# Patient Record
Sex: MALE | Race: WHITE | NOT HISPANIC OR LATINO | Employment: FULL TIME | URBAN - METROPOLITAN AREA
[De-identification: names, ages, dates, MRNs, and addresses within clinical notes are randomized per-mention and may not be internally consistent; named-entity substitution may affect disease eponyms.]

---

## 2019-09-18 PROBLEM — I10 HYPERTENSION: Status: ACTIVE | Noted: 2019-09-18

## 2019-09-18 PROBLEM — I73.9 CLAUDICATION: Status: ACTIVE | Noted: 2018-09-20

## 2019-09-18 PROBLEM — E03.9 HYPOTHYROIDISM: Status: ACTIVE | Noted: 2019-09-18

## 2019-09-18 PROBLEM — I25.10 CORONARY ARTERY DISEASE: Status: ACTIVE | Noted: 2019-09-18

## 2019-09-18 PROBLEM — E78.5 HYPERLIPIDEMIA: Status: ACTIVE | Noted: 2019-09-18

## 2023-04-13 ENCOUNTER — HOSPITAL ENCOUNTER (OUTPATIENT)
Facility: HOSPITAL | Age: 58
Discharge: HOME | End: 2023-04-14
Attending: INTERNAL MEDICINE | Admitting: INTERNAL MEDICINE
Payer: COMMERCIAL

## 2023-04-13 DIAGNOSIS — I25.119 CHEST PAIN DUE TO CORONARY ARTERY DISEASE: Primary | ICD-10-CM

## 2023-04-13 LAB
ALBUMIN SERPL-MCNC: 3.9 G/DL (ref 3.5–5.2)
ALBUMIN/GLOB SERPL: 1.6 G/DL
ALP SERPL-CCNC: 68 U/L (ref 39–117)
ALT SERPL W P-5'-P-CCNC: 19 U/L (ref 1–41)
ANION GAP SERPL CALCULATED.3IONS-SCNC: 9 MMOL/L (ref 5–15)
ANION GAP SERPL CALCULATED.3IONS-SCNC: 9 MMOL/L (ref 5–15)
APTT PPP: 31.5 SECONDS (ref 61–76.5)
AST SERPL-CCNC: 39 U/L (ref 1–40)
BASOPHILS # BLD AUTO: 0.1 10*3/MM3 (ref 0–0.2)
BASOPHILS NFR BLD AUTO: 1 % (ref 0–1.5)
BILIRUB SERPL-MCNC: 0.3 MG/DL (ref 0–1.2)
BUN SERPL-MCNC: 24 MG/DL (ref 6–20)
BUN SERPL-MCNC: 24 MG/DL (ref 6–20)
BUN/CREAT SERPL: 17.3 (ref 7–25)
BUN/CREAT SERPL: 17.4 (ref 7–25)
CALCIUM SPEC-SCNC: 8.8 MG/DL (ref 8.6–10.5)
CALCIUM SPEC-SCNC: 8.9 MG/DL (ref 8.6–10.5)
CHLORIDE SERPL-SCNC: 102 MMOL/L (ref 98–107)
CHLORIDE SERPL-SCNC: 103 MMOL/L (ref 98–107)
CHOLEST SERPL-MCNC: 142 MG/DL (ref 0–200)
CO2 SERPL-SCNC: 27 MMOL/L (ref 22–29)
CO2 SERPL-SCNC: 28 MMOL/L (ref 22–29)
CREAT SERPL-MCNC: 1.38 MG/DL (ref 0.76–1.27)
CREAT SERPL-MCNC: 1.39 MG/DL (ref 0.76–1.27)
DEPRECATED RDW RBC AUTO: 47.7 FL (ref 37–54)
DEPRECATED RDW RBC AUTO: 47.7 FL (ref 37–54)
EGFRCR SERPLBLD CKD-EPI 2021: 59.1 ML/MIN/1.73
EGFRCR SERPLBLD CKD-EPI 2021: 59.6 ML/MIN/1.73
EOSINOPHIL # BLD AUTO: 0.2 10*3/MM3 (ref 0–0.4)
EOSINOPHIL NFR BLD AUTO: 2.6 % (ref 0.3–6.2)
ERYTHROCYTE [DISTWIDTH] IN BLOOD BY AUTOMATED COUNT: 13.1 % (ref 12.3–15.4)
ERYTHROCYTE [DISTWIDTH] IN BLOOD BY AUTOMATED COUNT: 13.4 % (ref 12.3–15.4)
GEN 5 2HR TROPONIN T REFLEX: 337 NG/L
GLOBULIN UR ELPH-MCNC: 2.5 GM/DL
GLUCOSE BLDC GLUCOMTR-MCNC: 81 MG/DL (ref 70–105)
GLUCOSE SERPL-MCNC: 133 MG/DL (ref 65–99)
GLUCOSE SERPL-MCNC: 136 MG/DL (ref 65–99)
HBA1C MFR BLD: 5.5 % (ref 4.8–5.6)
HCT VFR BLD AUTO: 43.6 % (ref 37.5–51)
HCT VFR BLD AUTO: 43.9 % (ref 37.5–51)
HDLC SERPL-MCNC: 47 MG/DL (ref 40–60)
HGB BLD-MCNC: 14.6 G/DL (ref 13–17.7)
HGB BLD-MCNC: 15 G/DL (ref 13–17.7)
INR PPP: 1.01 (ref 0.93–1.1)
LDLC SERPL CALC-MCNC: 65 MG/DL (ref 0–100)
LDLC/HDLC SERPL: 1.27 {RATIO}
LYMPHOCYTES # BLD AUTO: 2 10*3/MM3 (ref 0.7–3.1)
LYMPHOCYTES NFR BLD AUTO: 20.5 % (ref 19.6–45.3)
MCH RBC QN AUTO: 34.1 PG (ref 26.6–33)
MCH RBC QN AUTO: 34.6 PG (ref 26.6–33)
MCHC RBC AUTO-ENTMCNC: 33.3 G/DL (ref 31.5–35.7)
MCHC RBC AUTO-ENTMCNC: 34.3 G/DL (ref 31.5–35.7)
MCV RBC AUTO: 100.6 FL (ref 79–97)
MCV RBC AUTO: 102.2 FL (ref 79–97)
MONOCYTES # BLD AUTO: 0.6 10*3/MM3 (ref 0.1–0.9)
MONOCYTES NFR BLD AUTO: 6.6 % (ref 5–12)
NEUTROPHILS NFR BLD AUTO: 6.8 10*3/MM3 (ref 1.7–7)
NEUTROPHILS NFR BLD AUTO: 69.3 % (ref 42.7–76)
NRBC BLD AUTO-RTO: 0 /100 WBC (ref 0–0.2)
PLATELET # BLD AUTO: 321 10*3/MM3 (ref 140–450)
PLATELET # BLD AUTO: 325 10*3/MM3 (ref 140–450)
PMV BLD AUTO: 6.5 FL (ref 6–12)
PMV BLD AUTO: 7 FL (ref 6–12)
POTASSIUM SERPL-SCNC: 4 MMOL/L (ref 3.5–5.2)
POTASSIUM SERPL-SCNC: 4.2 MMOL/L (ref 3.5–5.2)
PROT SERPL-MCNC: 6.4 G/DL (ref 6–8.5)
PROTHROMBIN TIME: 10.4 SECONDS (ref 9.6–11.7)
RBC # BLD AUTO: 4.29 10*6/MM3 (ref 4.14–5.8)
RBC # BLD AUTO: 4.33 10*6/MM3 (ref 4.14–5.8)
SODIUM SERPL-SCNC: 139 MMOL/L (ref 136–145)
SODIUM SERPL-SCNC: 139 MMOL/L (ref 136–145)
T4 FREE SERPL-MCNC: 0.62 NG/DL (ref 0.93–1.7)
TRIGL SERPL-MCNC: 177 MG/DL (ref 0–150)
TROPONIN T DELTA: 56 NG/L
TROPONIN T SERPL HS-MCNC: 281 NG/L
TSH SERPL DL<=0.05 MIU/L-ACNC: 45.22 UIU/ML (ref 0.27–4.2)
VLDLC SERPL-MCNC: 30 MG/DL (ref 5–40)
WBC NRBC COR # BLD: 9.7 10*3/MM3 (ref 3.4–10.8)
WBC NRBC COR # BLD: 9.8 10*3/MM3 (ref 3.4–10.8)

## 2023-04-13 PROCEDURE — 84481 FREE ASSAY (FT-3): CPT | Performed by: STUDENT IN AN ORGANIZED HEALTH CARE EDUCATION/TRAINING PROGRAM

## 2023-04-13 PROCEDURE — 85027 COMPLETE CBC AUTOMATED: CPT | Performed by: STUDENT IN AN ORGANIZED HEALTH CARE EDUCATION/TRAINING PROGRAM

## 2023-04-13 PROCEDURE — 80053 COMPREHEN METABOLIC PANEL: CPT | Performed by: STUDENT IN AN ORGANIZED HEALTH CARE EDUCATION/TRAINING PROGRAM

## 2023-04-13 PROCEDURE — 85730 THROMBOPLASTIN TIME PARTIAL: CPT | Performed by: STUDENT IN AN ORGANIZED HEALTH CARE EDUCATION/TRAINING PROGRAM

## 2023-04-13 PROCEDURE — 84439 ASSAY OF FREE THYROXINE: CPT | Performed by: STUDENT IN AN ORGANIZED HEALTH CARE EDUCATION/TRAINING PROGRAM

## 2023-04-13 PROCEDURE — 83036 HEMOGLOBIN GLYCOSYLATED A1C: CPT | Performed by: STUDENT IN AN ORGANIZED HEALTH CARE EDUCATION/TRAINING PROGRAM

## 2023-04-13 PROCEDURE — 85025 COMPLETE CBC W/AUTO DIFF WBC: CPT | Performed by: STUDENT IN AN ORGANIZED HEALTH CARE EDUCATION/TRAINING PROGRAM

## 2023-04-13 PROCEDURE — G0378 HOSPITAL OBSERVATION PER HR: HCPCS

## 2023-04-13 PROCEDURE — 82962 GLUCOSE BLOOD TEST: CPT

## 2023-04-13 PROCEDURE — 93005 ELECTROCARDIOGRAM TRACING: CPT | Performed by: STUDENT IN AN ORGANIZED HEALTH CARE EDUCATION/TRAINING PROGRAM

## 2023-04-13 PROCEDURE — 84443 ASSAY THYROID STIM HORMONE: CPT | Performed by: STUDENT IN AN ORGANIZED HEALTH CARE EDUCATION/TRAINING PROGRAM

## 2023-04-13 PROCEDURE — 96365 THER/PROPH/DIAG IV INF INIT: CPT

## 2023-04-13 PROCEDURE — 25010000002 HEPARIN (PORCINE) 25000-0.45 UT/250ML-% SOLUTION: Performed by: STUDENT IN AN ORGANIZED HEALTH CARE EDUCATION/TRAINING PROGRAM

## 2023-04-13 PROCEDURE — 96366 THER/PROPH/DIAG IV INF ADDON: CPT

## 2023-04-13 PROCEDURE — 85610 PROTHROMBIN TIME: CPT | Performed by: STUDENT IN AN ORGANIZED HEALTH CARE EDUCATION/TRAINING PROGRAM

## 2023-04-13 PROCEDURE — 80061 LIPID PANEL: CPT | Performed by: STUDENT IN AN ORGANIZED HEALTH CARE EDUCATION/TRAINING PROGRAM

## 2023-04-13 PROCEDURE — 84484 ASSAY OF TROPONIN QUANT: CPT | Performed by: STUDENT IN AN ORGANIZED HEALTH CARE EDUCATION/TRAINING PROGRAM

## 2023-04-13 RX ORDER — ONDANSETRON 2 MG/ML
4 INJECTION INTRAMUSCULAR; INTRAVENOUS EVERY 6 HOURS PRN
Status: DISCONTINUED | OUTPATIENT
Start: 2023-04-13 | End: 2023-04-15 | Stop reason: HOSPADM

## 2023-04-13 RX ORDER — ONDANSETRON 4 MG/1
4 TABLET, FILM COATED ORAL EVERY 6 HOURS PRN
Status: DISCONTINUED | OUTPATIENT
Start: 2023-04-13 | End: 2023-04-15 | Stop reason: HOSPADM

## 2023-04-13 RX ORDER — LEVOTHYROXINE SODIUM 0.2 MG/1
200 TABLET ORAL
Status: DISCONTINUED | OUTPATIENT
Start: 2023-04-14 | End: 2023-04-14

## 2023-04-13 RX ORDER — SODIUM CHLORIDE, SODIUM LACTATE, POTASSIUM CHLORIDE, CALCIUM CHLORIDE 600; 310; 30; 20 MG/100ML; MG/100ML; MG/100ML; MG/100ML
50 INJECTION, SOLUTION INTRAVENOUS CONTINUOUS
Status: DISCONTINUED | OUTPATIENT
Start: 2023-04-14 | End: 2023-04-13

## 2023-04-13 RX ORDER — SODIUM CHLORIDE 0.9 % (FLUSH) 0.9 %
10 SYRINGE (ML) INJECTION EVERY 12 HOURS SCHEDULED
Status: DISCONTINUED | OUTPATIENT
Start: 2023-04-13 | End: 2023-04-15 | Stop reason: HOSPADM

## 2023-04-13 RX ORDER — HEPARIN SODIUM 5000 [USP'U]/ML
5000 INJECTION, SOLUTION INTRAVENOUS; SUBCUTANEOUS EVERY 8 HOURS SCHEDULED
Status: DISCONTINUED | OUTPATIENT
Start: 2023-04-13 | End: 2023-04-13

## 2023-04-13 RX ORDER — SODIUM CHLORIDE 9 MG/ML
40 INJECTION, SOLUTION INTRAVENOUS AS NEEDED
Status: DISCONTINUED | OUTPATIENT
Start: 2023-04-13 | End: 2023-04-15 | Stop reason: HOSPADM

## 2023-04-13 RX ORDER — ATORVASTATIN CALCIUM 40 MG/1
80 TABLET, FILM COATED ORAL DAILY
Status: DISCONTINUED | OUTPATIENT
Start: 2023-04-14 | End: 2023-04-15 | Stop reason: HOSPADM

## 2023-04-13 RX ORDER — SODIUM CHLORIDE 0.9 % (FLUSH) 0.9 %
10 SYRINGE (ML) INJECTION AS NEEDED
Status: DISCONTINUED | OUTPATIENT
Start: 2023-04-13 | End: 2023-04-15 | Stop reason: HOSPADM

## 2023-04-13 RX ORDER — NITROGLYCERIN 0.4 MG/1
0.4 TABLET SUBLINGUAL
Status: DISCONTINUED | OUTPATIENT
Start: 2023-04-13 | End: 2023-04-15 | Stop reason: HOSPADM

## 2023-04-13 RX ORDER — HEPARIN SODIUM 10000 [USP'U]/100ML
10.5 INJECTION, SOLUTION INTRAVENOUS
Status: DISCONTINUED | OUTPATIENT
Start: 2023-04-13 | End: 2023-04-15 | Stop reason: HOSPADM

## 2023-04-13 RX ORDER — METOPROLOL SUCCINATE 50 MG/1
50 TABLET, EXTENDED RELEASE ORAL
Status: DISCONTINUED | OUTPATIENT
Start: 2023-04-14 | End: 2023-04-15 | Stop reason: HOSPADM

## 2023-04-13 RX ORDER — ASPIRIN 81 MG/1
81 TABLET ORAL DAILY
Status: DISCONTINUED | OUTPATIENT
Start: 2023-04-14 | End: 2023-04-15 | Stop reason: HOSPADM

## 2023-04-13 RX ADMIN — Medication 10 ML: at 22:25

## 2023-04-13 RX ADMIN — HEPARIN SODIUM 10.5 UNITS/KG/HR: 10000 INJECTION, SOLUTION INTRAVENOUS at 22:21

## 2023-04-13 NOTE — Clinical Note
Angio-Seal was used in achieving hemostasis. Closure device deployed in the vessel. Hemostasis achieved successfully.

## 2023-04-13 NOTE — Clinical Note
The left DP pulse is +2. The right DP pulse is detected w/ doppler. The PT pulses are +2 bilaterally.

## 2023-04-14 VITALS
OXYGEN SATURATION: 94 % | BODY MASS INDEX: 27.85 KG/M2 | HEIGHT: 73 IN | SYSTOLIC BLOOD PRESSURE: 149 MMHG | DIASTOLIC BLOOD PRESSURE: 81 MMHG | WEIGHT: 210.1 LBS | TEMPERATURE: 97.6 F | RESPIRATION RATE: 12 BRPM | HEART RATE: 61 BPM

## 2023-04-14 LAB
APTT PPP: 44.7 SECONDS (ref 61–76.5)
APTT PPP: 83 SECONDS (ref 61–76.5)
T3FREE SERPL-MCNC: 1.62 PG/ML (ref 2–4.4)

## 2023-04-14 PROCEDURE — 93454 CORONARY ARTERY ANGIO S&I: CPT | Performed by: INTERNAL MEDICINE

## 2023-04-14 PROCEDURE — C1894 INTRO/SHEATH, NON-LASER: HCPCS | Performed by: INTERNAL MEDICINE

## 2023-04-14 PROCEDURE — G0378 HOSPITAL OBSERVATION PER HR: HCPCS

## 2023-04-14 PROCEDURE — 85730 THROMBOPLASTIN TIME PARTIAL: CPT | Performed by: INTERNAL MEDICINE

## 2023-04-14 PROCEDURE — 25510000001 IOPAMIDOL PER 1 ML: Performed by: INTERNAL MEDICINE

## 2023-04-14 PROCEDURE — C1760 CLOSURE DEV, VASC: HCPCS | Performed by: INTERNAL MEDICINE

## 2023-04-14 PROCEDURE — 25010000002 FENTANYL CITRATE (PF) 100 MCG/2ML SOLUTION: Performed by: INTERNAL MEDICINE

## 2023-04-14 PROCEDURE — 85347 COAGULATION TIME ACTIVATED: CPT

## 2023-04-14 PROCEDURE — C1769 GUIDE WIRE: HCPCS | Performed by: INTERNAL MEDICINE

## 2023-04-14 PROCEDURE — 96366 THER/PROPH/DIAG IV INF ADDON: CPT

## 2023-04-14 PROCEDURE — 25010000002 MIDAZOLAM PER 1 MG: Performed by: INTERNAL MEDICINE

## 2023-04-14 PROCEDURE — 85730 THROMBOPLASTIN TIME PARTIAL: CPT | Performed by: STUDENT IN AN ORGANIZED HEALTH CARE EDUCATION/TRAINING PROGRAM

## 2023-04-14 PROCEDURE — 25010000002 HEPARIN (PORCINE) 25000-0.45 UT/250ML-% SOLUTION: Performed by: STUDENT IN AN ORGANIZED HEALTH CARE EDUCATION/TRAINING PROGRAM

## 2023-04-14 PROCEDURE — 99152 MOD SED SAME PHYS/QHP 5/>YRS: CPT | Performed by: INTERNAL MEDICINE

## 2023-04-14 RX ORDER — METOPROLOL SUCCINATE 50 MG/1
50 TABLET, EXTENDED RELEASE ORAL
Qty: 30 TABLET | Refills: 0 | Status: SHIPPED | OUTPATIENT
Start: 2023-04-15

## 2023-04-14 RX ORDER — MIDAZOLAM HYDROCHLORIDE 1 MG/ML
INJECTION INTRAMUSCULAR; INTRAVENOUS
Status: DISCONTINUED | OUTPATIENT
Start: 2023-04-14 | End: 2023-04-14 | Stop reason: HOSPADM

## 2023-04-14 RX ORDER — CLOPIDOGREL BISULFATE 75 MG/1
75 TABLET ORAL DAILY
Status: DISCONTINUED | OUTPATIENT
Start: 2023-04-14 | End: 2023-04-15 | Stop reason: HOSPADM

## 2023-04-14 RX ORDER — SODIUM CHLORIDE 9 MG/ML
125 INJECTION, SOLUTION INTRAVENOUS CONTINUOUS
Status: DISCONTINUED | OUTPATIENT
Start: 2023-04-14 | End: 2023-04-15 | Stop reason: HOSPADM

## 2023-04-14 RX ORDER — CLOPIDOGREL BISULFATE 75 MG/1
75 TABLET ORAL DAILY
COMMUNITY

## 2023-04-14 RX ORDER — NITROGLYCERIN 0.4 MG/1
0.4 TABLET SUBLINGUAL
COMMUNITY

## 2023-04-14 RX ORDER — SODIUM CHLORIDE 9 MG/ML
75 INJECTION, SOLUTION INTRAVENOUS CONTINUOUS
Status: CANCELLED | OUTPATIENT
Start: 2023-04-14

## 2023-04-14 RX ORDER — LIDOCAINE HYDROCHLORIDE 20 MG/ML
INJECTION, SOLUTION INFILTRATION; PERINEURAL
Status: DISCONTINUED | OUTPATIENT
Start: 2023-04-14 | End: 2023-04-14 | Stop reason: HOSPADM

## 2023-04-14 RX ORDER — LEVOTHYROXINE SODIUM 175 UG/1
175 TABLET ORAL 3 TIMES WEEKLY
COMMUNITY

## 2023-04-14 RX ORDER — FENTANYL CITRATE 50 UG/ML
25 INJECTION, SOLUTION INTRAMUSCULAR; INTRAVENOUS ONCE
Status: CANCELLED | OUTPATIENT
Start: 2023-04-14 | End: 2023-04-14

## 2023-04-14 RX ORDER — FENTANYL CITRATE 50 UG/ML
INJECTION, SOLUTION INTRAMUSCULAR; INTRAVENOUS
Status: DISCONTINUED | OUTPATIENT
Start: 2023-04-14 | End: 2023-04-14 | Stop reason: HOSPADM

## 2023-04-14 RX ORDER — EZETIMIBE 10 MG/1
10 TABLET ORAL DAILY
COMMUNITY

## 2023-04-14 RX ORDER — SODIUM CHLORIDE 9 MG/ML
INJECTION, SOLUTION INTRAVENOUS
Status: COMPLETED | OUTPATIENT
Start: 2023-04-14 | End: 2023-04-14

## 2023-04-14 RX ORDER — ACETAMINOPHEN 325 MG/1
650 TABLET ORAL EVERY 4 HOURS PRN
Status: CANCELLED | OUTPATIENT
Start: 2023-04-14

## 2023-04-14 RX ORDER — SODIUM CHLORIDE 9 MG/ML
250 INJECTION, SOLUTION INTRAVENOUS ONCE AS NEEDED
Status: CANCELLED | OUTPATIENT
Start: 2023-04-14

## 2023-04-14 RX ORDER — MIDAZOLAM HYDROCHLORIDE 1 MG/ML
1 INJECTION INTRAMUSCULAR; INTRAVENOUS ONCE
Status: CANCELLED | OUTPATIENT
Start: 2023-04-14 | End: 2023-04-14

## 2023-04-14 RX ORDER — LEVOTHYROXINE SODIUM 175 UG/1
175 TABLET ORAL
Status: DISCONTINUED | OUTPATIENT
Start: 2023-04-14 | End: 2023-04-15 | Stop reason: HOSPADM

## 2023-04-14 RX ADMIN — ASPIRIN 81 MG: 81 TABLET, COATED ORAL at 08:34

## 2023-04-14 RX ADMIN — CLOPIDOGREL BISULFATE 75 MG: 75 TABLET ORAL at 11:14

## 2023-04-14 RX ADMIN — ATORVASTATIN CALCIUM 80 MG: 40 TABLET, FILM COATED ORAL at 08:34

## 2023-04-14 RX ADMIN — SODIUM CHLORIDE 125 ML/HR: 9 INJECTION, SOLUTION INTRAVENOUS at 12:53

## 2023-04-14 RX ADMIN — Medication 10 ML: at 08:34

## 2023-04-14 RX ADMIN — HEPARIN SODIUM 11.5 UNITS/KG/HR: 10000 INJECTION, SOLUTION INTRAVENOUS at 06:19

## 2023-04-14 NOTE — DISCHARGE SUMMARY
[facility] Medicine Services   DISCHARGE SUMMARY    Patient Name: Ayan Blair  : 1965  MRN: [id number]    Date of Admission: 2023  Date of Discharge:  23    Primary Care Physician: Silvino Peoples MD      Presenting Problem:   Chest pain due to coronary artery disease [I25.119]    Active and Resolved Hospital Problems:  Active Hospital Problems    Diagnosis POA   • **Chest pain due to coronary artery disease [I25.119] Yes      Resolved Hospital Problems   No resolved problems to display.         Hospital Course     Hospital Course:  Ayan Blair is a 57 y.o. male with past medical history of CAD, hypertension, hyperlipidemia, and hypothyroidism who presented to [facility] on 2023 complaining of chest pain.  States he had intermittent episodes of 6/10 non-radiating chest pain lasting over 3 hours in the morning complicated by diaphoresis.  He went to hospitals ER for evaluate, he was to be transferred to [facility] but left hospitals AMA.  Patient then called his cardiologist and was directly admitted to [facility] for cardiac evaluation.  Cardiology consulted, started on heparin drip for elevated troponin/NSTEMI.  Cardiology planning cardiac cath on .  Cath negative, okay to discharge per cardiology with outpatient follow-up.  Beta-blocker added back.  Patient is stable for discharge home with follow-up with PCP and cardiology as an outpatient.        DISCHARGE Follow Up Recommendations for labs and diagnostics:   Follow-up with PCP and cardiology    Reasons For Change In Medications and Indications for New Medications:  Metoprolol added back.    Day of Discharge     Vital Signs:  Temp:  [97.9 °F (36.6 °C)-98.4 °F (36.9 °C)] 97.9 °F (36.6 °C)  Heart Rate:  [58-72] 62  Resp:  [10-16] 16  BP: (131-167)/() 162/100  Flow (L/min):  [2] 2    Physical Exam:  General: Awake, alert, NAD  Eyes: PERRL, EOMI, conjunctivae are clear  Cardiovascular: Regular rate and rhythm, no  murmurs  Respiratory: Clear to auscultation bilaterally, no wheezing or rales, unlabored breathing  Abdomen: Soft, nontender, positive bowel sounds, no guarding  Neurologic: A&O, CN grossly intact, moves all extremities spontaneously  Musculoskeletal: Normal range of motion, no deformities  Skin: Warm, dry, intact        Pertinent  and/or Most Recent Results     LAB RESULTS:      Lab 04/14/23  1249 04/14/23  0457 04/13/23 2255 04/13/23  2101   WBC  --   --  9.70 9.80   HEMOGLOBIN  --   --  14.6 15.0   HEMATOCRIT  --   --  43.9 43.6   PLATELETS  --   --  325 321   NEUTROS ABS  --   --   --  6.80   LYMPHS ABS  --   --   --  2.00   MONOS ABS  --   --   --  0.60   EOS ABS  --   --   --  0.20   MCV  --   --  102.2* 100.6*   PROTIME  --   --  10.4  --    APTT 83.0* 44.7* 31.5*  --          Lab 04/13/23 2255 04/13/23  2101   SODIUM 139 139   POTASSIUM 4.2 4.0   CHLORIDE 102 103   CO2 28.0 27.0   ANION GAP 9.0 9.0   BUN 24* 24*   CREATININE 1.39* 1.38*   EGFR 59.1* 59.6*   GLUCOSE 133* 136*   CALCIUM 8.8 8.9   HEMOGLOBIN A1C  --  5.50   TSH  --  45.220*         Lab 04/13/23  2101   TOTAL PROTEIN 6.4   ALBUMIN 3.9   GLOBULIN 2.5   ALT (SGPT) 19   AST (SGOT) 39   BILIRUBIN 0.3   ALK PHOS 68         Lab 04/13/23 2255 04/13/23 2101   HSTROP T 337* 281*   PROTIME 10.4  --    INR 1.01  --          Lab 04/13/23  2101   CHOLESTEROL 142   LDL CHOL 65   HDL CHOL 47   TRIGLYCERIDES 177*             Brief Urine Lab Results     None        Microbiology Results (last 10 days)     ** No results found for the last 240 hours. **                           Labs Pending at Discharge:      Procedures Performed  Procedure(s):  Left Heart Cath and coronary angiogram         Consults:   Consults     Date and Time Order Name Status Description    4/13/2023  9:53 PM Inpatient Hospitalist Consult              Discharge Details        Discharge Medications      Changes to Medications      Instructions Start Date   metoprolol succinate XL 50 MG 24  hr tablet  Commonly known as: Toprol XL  What changed: See the new instructions.   50 mg, Oral, Every 24 Hours Scheduled   Start Date: April 15, 2023        Continue These Medications      Instructions Start Date   aspirin 81 MG EC tablet   Every 24 Hours      atorvastatin 40 MG tablet  Commonly known as: LIPITOR   Every 24 Hours      clopidogrel 75 MG tablet  Commonly known as: PLAVIX   75 mg, Oral, Daily      ezetimibe 10 MG tablet  Commonly known as: ZETIA   10 mg, Oral, Daily      levothyroxine 175 MCG tablet  Commonly known as: SYNTHROID, LEVOTHROID   175 mcg, Oral, 3 Times Weekly, Monday, Wednesday, Friday      nitroglycerin 0.4 MG SL tablet  Commonly known as: NITROSTAT   0.4 mg, Sublingual, Every 5 Minutes PRN, Take no more than 3 doses in 15 minutes.             Allergies   Allergen Reactions   • Penicillins Unknown - Low Severity     Was told he is allergic by parents. Unknown reaction.         Discharge Disposition:  Home or Self Care    Diet:  Hospital:  Diet Order   Procedures   • NPO Diet NPO Type: Sips with Meds         Discharge Activity:         CODE STATUS:  Code Status and Medical Interventions:   Ordered at: 04/14/23 1020     Code Status (Patient has no pulse and is not breathing):    CPR (Attempt to Resuscitate)     Medical Interventions (Patient has pulse or is breathing):    Full Support     Release to patient:    Routine Release         Future Appointments   Date Time Provider Department Center   4/19/2023 12:40 PM Russ Richard MD [facility]           Time spent on Discharge including face to face service: 35 minutes    Signature:    Electronically signed by Perico Grigsby DO, 04/14/23, 4:04 PM EDT.      Part of this note may be an electronic transcription/translation of spoken language to printed text using the Dragon Dictation System.

## 2023-04-14 NOTE — PLAN OF CARE
Goal Outcome Evaluation:                 Problem: Adult Inpatient Plan of Care  Goal: Plan of Care Review  Outcome: Ongoing, Progressing  Goal: Patient-Specific Goal (Individualized)  Outcome: Ongoing, Progressing  Goal: Absence of Hospital-Acquired Illness or Injury  Outcome: Ongoing, Progressing  Intervention: Identify and Manage Fall Risk  Recent Flowsheet Documentation  Taken 4/14/2023 1400 by Binta Whalen RN  Safety Promotion/Fall Prevention:   assistive device/personal items within reach   clutter free environment maintained   safety round/check completed   room organization consistent   nonskid shoes/slippers when out of bed   lighting adjusted   fall prevention program maintained  Taken 4/14/2023 1200 by Binta Whalen RN  Safety Promotion/Fall Prevention:   assistive device/personal items within reach   clutter free environment maintained   safety round/check completed   room organization consistent   nonskid shoes/slippers when out of bed   lighting adjusted  Intervention: Prevent Skin Injury  Recent Flowsheet Documentation  Taken 4/14/2023 1141 by Binta Whalen RN  Skin Protection: adhesive use limited  Intervention: Prevent Infection  Recent Flowsheet Documentation  Taken 4/14/2023 1400 by Binta Whalen RN  Infection Prevention:   single patient room provided   rest/sleep promoted   personal protective equipment utilized   hand hygiene promoted   equipment surfaces disinfected  Taken 4/14/2023 1200 by Binta Whalen RN  Infection Prevention:   single patient room provided   rest/sleep promoted   personal protective equipment utilized   hand hygiene promoted   equipment surfaces disinfected  Goal: Optimal Comfort and Wellbeing  Outcome: Ongoing, Progressing  Intervention: Provide Person-Centered Care  Recent Flowsheet Documentation  Taken 4/14/2023 1141 by Binta Whalen RN  Trust Relationship/Rapport:   care explained   choices provided  Goal: Readiness for Transition of  Care  Outcome: Ongoing, Progressing     Problem: Hypertension Comorbidity  Goal: Blood Pressure in Desired Range  Outcome: Ongoing, Progressing  Intervention: Maintain Blood Pressure Management  Recent Flowsheet Documentation  Taken 4/14/2023 1400 by Binta Whalen, RN  Medication Review/Management: medications reviewed  Taken 4/14/2023 1200 by Binta Whalen, RN  Medication Review/Management: medications reviewed

## 2023-04-14 NOTE — PLAN OF CARE
Goal Outcome Evaluation:         Pt aaox4, vss, direct admit from [place], pt had chest pain and dizziness yesterday morning, admitted for heart cath today, will be NPO after breakfast, elevated troponins on heparin gtt at 11.5, pt up ad ailyn, no complaints of pain or discomfort

## 2023-04-14 NOTE — H&P
[facility] Medicine Services      Patient Name: Ayan Blair  : 1965  MRN: [id number]  Primary Care Physician:  Silvino Peoples MD  Date of admission: 2023      Subjective      Chief Complaint: Chest pain    History of Present Illness: Ayan Blair is a 57 y.o. male who presented to [facility] on 2023 complaining of chest pain.  States he had intermittent episodes of 6/10 non-radiating chest pain lasting over 3 hours this morning complicated by diaphoresis.  He went to hospitals ER for evaluate, he was to be transferred to [facility] but left hospitals AMA.  Patient then called his cardiologist and was directly admitted to [facility] for cardiac evaluation.      ROS   12 point ROS reviewed and negative except as mentioned above      Personal History     No past medical history on file.    No past surgical history on file.    Family History: family history is not on file. Otherwise pertinent FHx was reviewed and not pertinent to current issue.    Social History:      Home Medications:  Prior to Admission Medications     Prescriptions Last Dose Informant Patient Reported? Taking?    anastrozole (ARIMIDEX) 1 MG tablet   Yes No    Daily.    aspirin (ASPIR-LOW) 81 MG EC tablet   Yes No    Daily.    atorvastatin (LIPITOR) 40 MG tablet   Yes No    Daily.    levothyroxine (SYNTHROID) 200 MCG tablet   Yes No    Daily.    metoprolol succinate XL (TOPROL XL) 50 MG 24 hr tablet   Yes No    TOPROL XL 50 MG XR24H-TAB            Allergies:  Not on File    Objective      Vitals:   Temp:  [98 °F (36.7 °C)] 98 °F (36.7 °C)  Heart Rate:  [71] 71  Resp:  [13] 13  BP: (139)/(82) 139/82    Physical Exam  Constitutional:       General: He is not in acute distress.     Appearance: Normal appearance. He is obese. He is not toxic-appearing.   HENT:      Head: Normocephalic and atraumatic.      Nose: Nose normal. No congestion.      Mouth/Throat:      Pharynx: Oropharynx is clear. No oropharyngeal  exudate.   Eyes:      General: No scleral icterus.  Cardiovascular:      Rate and Rhythm: Normal rate and regular rhythm.      Heart sounds: No murmur heard.    No friction rub. No gallop.   Pulmonary:      Effort: No respiratory distress.      Breath sounds: No wheezing or rales.   Abdominal:      General: There is no distension.      Tenderness: There is no abdominal tenderness. There is no guarding.   Musculoskeletal:         General: No swelling or deformity.      Cervical back: Normal range of motion. No rigidity.      Right lower leg: No edema.      Left lower leg: No edema.   Skin:     Coloration: Skin is not jaundiced.      Findings: No bruising or lesion.   Neurological:      General: No focal deficit present.      Mental Status: He is alert and oriented to person, place, and time.      Motor: No weakness.          Result Review    Result Review:  I have personally reviewed the results from the time of this admission to 4/13/2023 20:46 EDT and agree with these findings:  [x]  Laboratory  []  Microbiology  []  Radiology  []  EKG/Telemetry   []  Cardiology/Vascular   []  Pathology  []  Old records  []  Other:        Assessment & Plan        Active Hospital Problems:  Active Hospital Problems    Diagnosis    • **Chest pain due to coronary artery disease      Plan:     #Chest pain  #NSTEMI    - Left AMA from [facility] , do not have records    - direct admit by cardiology    - aspirin    - statin    - EKG    - troponin 281, trend    - cardiology following    - heparin drip started    - NPO after midnight    - defer cath vs myoview stress to cardiology    - LR @ 75/hr    - a1c, lipid panel, tsh    - resume home Toprol    - nitro PRN    #Hypothyroid    - resume home synthroid    - check free t3 and  FT4             DVT prophylaxis: heparin drip  Medical DVT prophylaxis orders are present.    CODE STATUS:       Admission Status:  I believe this patient meets inpatient status.    I discussed the patient's findings and my  recommendations with patient.    This patient has been examined wearing appropriate Personal Protective Equipment and discussed with PAtient. 04/13/23      Signature: Electronically signed by Fabienne Valdes DO, 04/13/23, 10:15 PM EDT.

## 2023-04-14 NOTE — NURSING NOTE
Received call from Dr. Richard who stated patient would be off bedrest at 6pm and could discharge around 7pm this evening. Dr. Grigsby notified.

## 2023-04-14 NOTE — PLAN OF CARE
Problem: Adult Inpatient Plan of Care  Goal: Plan of Care Review  Outcome: Adequate for Care Transition  Flowsheets (Taken 4/14/2023 1935)  Progress: improving  Plan of Care Reviewed With:   patient   spouse  Outcome Evaluation: Patient okay to discharge home with family per MD.  Goal: Patient-Specific Goal (Individualized)  Outcome: Adequate for Care Transition  Goal: Absence of Hospital-Acquired Illness or Injury  Outcome: Adequate for Care Transition  Goal: Optimal Comfort and Wellbeing  Outcome: Adequate for Care Transition  Goal: Readiness for Transition of Care  Outcome: Adequate for Care Transition     Problem: Hypertension Comorbidity  Goal: Blood Pressure in Desired Range  Outcome: Adequate for Care Transition   Goal Outcome Evaluation:  Plan of Care Reviewed With: patient, spouse        Progress: improving  Outcome Evaluation: Patient okay to discharge home with family per MD.

## 2023-04-14 NOTE — NURSING NOTE
Second nurse skin assessment completed with primary nurse.  This writer agrees with the primary nurse findings at the time of admission.

## 2023-04-14 NOTE — NURSING NOTE
Reviewed discharge instructions with patient and wife, at bedside. Discharge instructions, medications, complications and follow-up appointments reviewed. Patient and wife verbalized understanding and voiced no comments or concerns. After visit summary given to patient. Transportation to main lobby was suggested and offered to patient. Patient declined transportation. Patient left the nursing unit on foot, escorted to main lobby by wife.

## 2023-04-14 NOTE — CONSULTS
CARDIOLOGY CONSULT:    Ayan Blair  1965  male  [id number]      Referring Provider: Hospitalist  Reason for Consultation: Chest pain    Patient Care Team:  Silvino Peoples MD as PCP - General (Family Medicine)    Chief complaint chest pain    Subjective .     History of present illness:  Ayan Blair is a 57 y.o. male with history of coronary status post and placement to the LAD in the past history of hypertension hyperlipidemia presented to the hospital with complaints of chest pain.  Patient was in outlying hospital and was transferred and admitted to our hospital.  Patient's chest pain is mostly substernal without any radiation.  Shortness of breath.  No complains of any PND orthopnea.  No palpitation dizziness syncope or swelling of the feet.  Is not been taking his medicine regularly but he still continues to smoke.  In the hospital patient was noted to have elevated troponin consistent with non-STEMI.  Review of Systems   Constitutional: Negative for fever and malaise/fatigue.   HENT: Negative for ear pain and nosebleeds.    Eyes: Negative for blurred vision and double vision.   Cardiovascular: Positive for chest pain. Negative for dyspnea on exertion and palpitations.   Respiratory: Positive for shortness of breath. Negative for cough.    Skin: Negative for rash.   Musculoskeletal: Negative for joint pain.   Gastrointestinal: Negative for abdominal pain, nausea and vomiting.   Neurological: Negative for focal weakness and headaches.   Psychiatric/Behavioral: Negative for depression. The patient is not nervous/anxious.    All other systems reviewed and are negative.      History  Past Medical History:   Diagnosis Date   • Coronary artery disease    • Disease of thyroid gland    • Hypertension        Past Surgical History:   Procedure Laterality Date   • APPENDECTOMY     • CARDIAC CATHETERIZATION     • CARDIAC SURGERY     • COLONOSCOPY         Family History   Problem Relation Age of Onset    • Arthritis Mother    • Diabetes Father    • Heart disease Father        Social History     Tobacco Use   • Smoking status: Every Day     Packs/day: 1.50     Years: 35.00     Pack years: 52.50     Types: Cigarettes   • Smokeless tobacco: Never   Vaping Use   • Vaping Use: Never used   Substance Use Topics   • Alcohol use: Yes     Comment: beer wine and bourbon   • Drug use: Never        Medications Prior to Admission   Medication Sig Dispense Refill Last Dose   • atorvastatin (LIPITOR) 40 MG tablet Daily.   4/12/2023   • clopidogrel (PLAVIX) 75 MG tablet Take 1 tablet by mouth Daily.      • ezetimibe (ZETIA) 10 MG tablet Take 1 tablet by mouth Daily.      • levothyroxine (SYNTHROID, LEVOTHROID) 175 MCG tablet Take 1 tablet by mouth 3 (Three) Times a Week. Monday, Wednesday, Friday      • nitroglycerin (NITROSTAT) 0.4 MG SL tablet Place 1 tablet under the tongue Every 5 (Five) Minutes As Needed for Chest Pain. Take no more than 3 doses in 15 minutes.      • aspirin 81 MG EC tablet Daily.            Penicillins    Scheduled Meds:aspirin, 81 mg, Oral, Daily  atorvastatin, 80 mg, Oral, Daily  clopidogrel, 75 mg, Oral, Daily  levothyroxine, 175 mcg, Oral, Q AM  metoprolol succinate XL, 50 mg, Oral, Q24H  sodium chloride, 10 mL, Intravenous, Q12H      Continuous Infusions:heparin, 10.5 Units/kg/hr, Last Rate: 11.5 Units/kg/hr (04/14/23 0619)  sodium chloride, 125 mL/hr, Last Rate: 125 mL/hr (04/14/23 1253)      PRN Meds:.•  heparin  •  heparin  •  nitroglycerin  •  ondansetron **OR** ondansetron  •  sodium chloride  •  sodium chloride    Objective     VITAL SIGNS  Vitals:    04/14/23 0514 04/14/23 0516 04/14/23 0833 04/14/23 0947   BP:  154/85 142/96 136/87   BP Location:  Right arm Right arm Right arm   Patient Position:  Lying Lying Lying   Pulse:  72 58 60   Resp:  14 15 14   Temp:  98.3 °F (36.8 °C)  98.4 °F (36.9 °C)   TempSrc:  Oral  Oral   SpO2:  92% 95% 94%   Weight: 95.3 kg (210 lb 1.6 oz)      Height:      "      Flowsheet Rows    Flowsheet Row First Filed Value   Admission Height 185.4 cm (73\") Documented at 04/13/2023 1900   Admission Weight 95.3 kg (210 lb 1.6 oz) Documented at 04/13/2023 1900           TELEMETRY: Normal sinus rhythm with nonspecific ST segment abnormality    Physical Exam:  Constitutional:       Appearance: Well-developed.   Eyes:      General: No scleral icterus.     Conjunctiva/sclera: Conjunctivae normal.      Pupils: Pupils are equal, round, and reactive to light.   HENT:      Head: Normocephalic and atraumatic.   Neck:      Vascular: No carotid bruit or JVD.   Pulmonary:      Effort: Pulmonary effort is normal.      Breath sounds: Normal breath sounds. No wheezing. No rales.   Cardiovascular:      Normal rate. Regular rhythm.   Pulses:     Intact distal pulses.   Abdominal:      General: Bowel sounds are normal.      Palpations: Abdomen is soft.   Musculoskeletal: Normal range of motion.      Cervical back: Normal range of motion and neck supple. Skin:     General: Skin is warm and dry.      Findings: No rash.   Neurological:      Mental Status: Alert.      Comments: No focal deficits          Results Review:   I reviewed the patient's new clinical results.  Lab Results (last 24 hours)     Procedure Component Value Units Date/Time    aPTT [[id number]] Collected: 04/14/23 1249    Specimen: Blood, Venous Line Updated: 04/14/23 1313    T3, Free [[id number]]  (Abnormal) Collected: 04/13/23 2101    Specimen: Blood Updated: 04/14/23 1152     T3, Free 1.62 pg/mL     Narrative:      Results may be falsely increased if patient taking Biotin.      aPTT [[id number]]  (Abnormal) Collected: 04/14/23 0457    Specimen: Blood Updated: 04/14/23 0600     PTT 44.7 seconds     High Sensitivity Troponin T 2Hr [[id number]]  (Abnormal) Collected: 04/13/23 2255    Specimen: Blood Updated: 04/13/23 2347     HS Troponin T 337 ng/L      Troponin T Delta 56 ng/L     Narrative:      High Sensitive Troponin T Reference " Range:  <10.0 ng/L- Negative Female for AMI  <15.0 ng/L- Negative Male for AMI  >=10 - Abnormal Female indicating possible myocardial injury.  >=15 - Abnormal Male indicating possible myocardial injury.   Clinicians would have to utilize clinical acumen, EKG, Troponin, and serial changes to determine if it is an Acute Myocardial Infarction or myocardial injury due to an underlying chronic condition.         Protime-INR [[id number]]  (Normal) Collected: 04/13/23 2255    Specimen: Blood Updated: 04/13/23 2342     Protime 10.4 Seconds      INR 1.01    aPTT [[id number]]  (Abnormal) Collected: 04/13/23 2255    Specimen: Blood Updated: 04/13/23 2342     PTT 31.5 seconds     Basic Metabolic Panel [[id number]]  (Abnormal) Collected: 04/13/23 2255    Specimen: Blood Updated: 04/13/23 2341     Glucose 133 mg/dL      BUN 24 mg/dL      Creatinine 1.39 mg/dL      Sodium 139 mmol/L      Potassium 4.2 mmol/L      Comment: Slight hemolysis detected by analyzer. Results may be affected.        Chloride 102 mmol/L      CO2 28.0 mmol/L      Calcium 8.8 mg/dL      BUN/Creatinine Ratio 17.3     Anion Gap 9.0 mmol/L      eGFR 59.1 mL/min/1.73     Narrative:      GFR Normal >60  Chronic Kidney Disease <60  Kidney Failure <15      CBC (No Diff) [[id number]]  (Abnormal) Collected: 04/13/23 2255    Specimen: Blood Updated: 04/13/23 2323     WBC 9.70 10*3/mm3      RBC 4.29 10*6/mm3      Hemoglobin 14.6 g/dL      Hematocrit 43.9 %      .2 fL      MCH 34.1 pg      MCHC 33.3 g/dL      RDW 13.4 %      RDW-SD 47.7 fl      MPV 7.0 fL      Platelets 325 10*3/mm3     T4, Free [[id number]]  (Abnormal) Collected: 04/13/23 2101    Specimen: Blood Updated: 04/13/23 2240     Free T4 0.62 ng/dL     Narrative:      Results may be falsely increased if patient taking Biotin.      High Sensitivity Troponin T [[id number]]  (Abnormal) Collected: 04/13/23 2101    Specimen: Blood Updated: 04/13/23 2146     HS Troponin T 281 ng/L     Narrative:      High Sensitive  Troponin T Reference Range:  <10.0 ng/L- Negative Female for AMI  <15.0 ng/L- Negative Male for AMI  >=10 - Abnormal Female indicating possible myocardial injury.  >=15 - Abnormal Male indicating possible myocardial injury.   Clinicians would have to utilize clinical acumen, EKG, Troponin, and serial changes to determine if it is an Acute Myocardial Infarction or myocardial injury due to an underlying chronic condition.         TSH [[id number]]  (Abnormal) Collected: 04/13/23 2101    Specimen: Blood Updated: 04/13/23 2142     TSH 45.220 uIU/mL     Comprehensive Metabolic Panel [[id number]]  (Abnormal) Collected: 04/13/23 2101    Specimen: Blood Updated: 04/13/23 2138     Glucose 136 mg/dL      BUN 24 mg/dL      Creatinine 1.38 mg/dL      Sodium 139 mmol/L      Potassium 4.0 mmol/L      Chloride 103 mmol/L      CO2 27.0 mmol/L      Calcium 8.9 mg/dL      Total Protein 6.4 g/dL      Albumin 3.9 g/dL      ALT (SGPT) 19 U/L      AST (SGOT) 39 U/L      Alkaline Phosphatase 68 U/L      Total Bilirubin 0.3 mg/dL      Globulin 2.5 gm/dL      A/G Ratio 1.6 g/dL      BUN/Creatinine Ratio 17.4     Anion Gap 9.0 mmol/L      eGFR 59.6 mL/min/1.73     Narrative:      GFR Normal >60  Chronic Kidney Disease <60  Kidney Failure <15      Lipid Panel [[id number]]  (Abnormal) Collected: 04/13/23 2101    Specimen: Blood Updated: 04/13/23 2138     Total Cholesterol 142 mg/dL      Triglycerides 177 mg/dL      HDL Cholesterol 47 mg/dL      LDL Cholesterol  65 mg/dL      VLDL Cholesterol 30 mg/dL      LDL/HDL Ratio 1.27    Narrative:      Cholesterol Reference Ranges  (U.S. Department of Health and Human Services ATP III Classifications)    Desirable          <200 mg/dL  Borderline High    200-239 mg/dL  High Risk          >240 mg/dL      Triglyceride Reference Ranges  (U.S. Department of Health and Human Services ATP III Classifications)    Normal           <150 mg/dL  Borderline High  150-199 mg/dL  High             200-499 mg/dL  Very High         >500 mg/dL    HDL Reference Ranges  (U.S. Department of Health and Human Services ATP III Classifications)    Low     <40 mg/dl (major risk factor for CHD)  High    >60 mg/dl ('negative' risk factor for CHD)        LDL Reference Ranges  (U.S. Department of Health and Human Services ATP III Classifications)    Optimal          <100 mg/dL  Near Optimal     100-129 mg/dL  Borderline High  130-159 mg/dL  High             160-189 mg/dL  Very High        >189 mg/dL    Hemoglobin A1c [[id number]]  (Normal) Collected: 04/13/23 2101    Specimen: Blood Updated: 04/13/23 2127     Hemoglobin A1C 5.50 %     CBC & Differential [[id number]]  (Abnormal) Collected: 04/13/23 2101    Specimen: Blood Updated: 04/13/23 2109    Narrative:      The following orders were created for panel order CBC & Differential.  Procedure                               Abnormality         Status                     ---------                               -----------         ------                     CBC Auto Differential[[id number]]        Abnormal            Final result                 Please view results for these tests on the individual orders.    CBC Auto Differential [[id number]]  (Abnormal) Collected: 04/13/23 2101    Specimen: Blood Updated: 04/13/23 2109     WBC 9.80 10*3/mm3      RBC 4.33 10*6/mm3      Hemoglobin 15.0 g/dL      Hematocrit 43.6 %      .6 fL      MCH 34.6 pg      MCHC 34.3 g/dL      RDW 13.1 %      RDW-SD 47.7 fl      MPV 6.5 fL      Platelets 321 10*3/mm3      Neutrophil % 69.3 %      Lymphocyte % 20.5 %      Monocyte % 6.6 %      Eosinophil % 2.6 %      Basophil % 1.0 %      Neutrophils, Absolute 6.80 10*3/mm3      Lymphocytes, Absolute 2.00 10*3/mm3      Monocytes, Absolute 0.60 10*3/mm3      Eosinophils, Absolute 0.20 10*3/mm3      Basophils, Absolute 0.10 10*3/mm3      nRBC 0.0 /100 WBC     POC Glucose Once [[id number]]  (Normal) Collected: 04/13/23 1931    Specimen: Blood Updated: 04/13/23 1932     Glucose 81 mg/dL       Comment: Serial Number: [id number]:  [id number]             Imaging Results (Last 24 Hours)     ** No results found for the last 24 hours. **          EKG      I personally viewed and interpreted the patient's EKG/Telemetry data:    ECHOCARDIOGRAM:         STRESS MYOVIEW:  .    CARDIAC CATHETERIZATION:    OTHER:         Assessment & Plan     Principal Problem:    Chest pain due to coronary artery disease  Coronary artery disease  Hypertension  Hyperlipidemia  Non-STEMI  Renal insufficiency    Patient presented with chest pain typical of angina and ruled in for non-STEMI  Patient is currently on aspirin Plavix and statins  Patient will be started on beta-blockers also  Patient blood pressure is slightly high and hence we will start her beta-blockers  Patient will have a cardiac catheterization performed.  Discussed with patient about procedure risks and benefits  With his troponin elevated patient is on heparin also  Further treatment based on cardiac catheterization findings.    I discussed the patients findings and my recommendations with patient and his    Russ Richard MD  04/14/23  13:15 EDT

## 2023-04-14 NOTE — PROGRESS NOTES
[facility] Medicine Services   Daily Progress Note      Patient Name: Ayan Blair  : 1965  MRN: [id number]  Primary Care Physician:  Silvino Peoples MD  Date of admission: 2023      Subjective      Chief Complaint: Chest pain    Patient seen and examined this morning.  Doing much better, chest pain/pressure seems to have resolved.  Denies any other complaints at this time.  Going for cardiac cath later today.    Pertinent positives as noted in HPI/subjective.  All other systems were reviewed and are negative.      Objective      Vitals:   Temp:  [98 °F (36.7 °C)-98.4 °F (36.9 °C)] 98.4 °F (36.9 °C)  Heart Rate:  [58-72] 60  Resp:  [10-15] 14  BP: (131-154)/(82-96) 136/87    Physical Exam:    General: Awake, alert, NAD  Eyes: PERRL, EOMI, conjunctivae are clear  Cardiovascular: Regular rate and rhythm, no murmurs  Respiratory: Clear to auscultation bilaterally, no wheezing or rales, unlabored breathing  Abdomen: Soft, nontender, positive bowel sounds, no guarding  Neurologic: A&O, CN grossly intact, moves all extremities spontaneously  Musculoskeletal: Normal range of motion, no deformities  Skin: Warm, dry, intact         Result Review    Result Review:  I have personally reviewed the results from the time of this admission to 2023 10:19 EDT and agree with these findings:  [x]  Laboratory  [x]  Microbiology  [x]  Radiology  [x]  EKG/Telemetry   [x]  Cardiology/Vascular   []  Pathology  [x]  Old records  []  Other:          Assessment & Plan      Brief Patient Summary:  Ayan Blair is a 57 y.o. male with past medical history of CAD, hypertension, hyperlipidemia, and hypothyroidism who presented to [facility] on 2023 complaining of chest pain.  States he had intermittent episodes of 6/10 non-radiating chest pain lasting over 3 hours in the morning complicated by diaphoresis.  He went to [facility] ER for evaluate, he was to be transferred to [facility] but left [facility] AMA.   Patient then called his cardiologist and was directly admitted to [facility] for cardiac evaluation.  Cardiology consulted, started on heparin drip for elevated troponin/NSTEMI.  Cardiology planning cardiac cath on 4/14.      aspirin, 81 mg, Oral, Daily  atorvastatin, 80 mg, Oral, Daily  levothyroxine, 200 mcg, Oral, Q AM  metoprolol succinate XL, 50 mg, Oral, Q24H  sodium chloride, 10 mL, Intravenous, Q12H       heparin, 10.5 Units/kg/hr, Last Rate: 11.5 Units/kg/hr (04/14/23 0619)         I have utilized all available, immediate resources to obtain, update, or review the patient's current medications including all prescriptions, over-the-counter products, herbals, cannabis/cannabidiol products, and vitamin.mineral/dietary (nutritional) supplements.    Active Hospital Problems:  Active Hospital Problems    Diagnosis    • **Chest pain due to coronary artery disease      Plan:     NSTEMI  CAD  -Troponin elevated, continue heparin drip  -Continue aspirin, Plavix, statin  -Previous stents in 2015 per the patient  -Nitro as needed  -Beta-blocker added  -Echo ordered  -Cardiology consulted and planning for [facility] today    Hypothyroidism  -Continue levothyroxine    DVT prophylaxis  -Heparin drip    CODE STATUS:    Code Status (Patient has no pulse and is not breathing): CPR (Attempt to Resuscitate)  Medical Interventions (Patient has pulse or is breathing): Full Support  Release to patient: Routine Release    Next of kin of Power of :   I confirmed that the patient's Advanced Care Plan is present, code status is documented, or surrogate decision maker is listed in the patient's medical record: YES    Hospital Medicine Quality Measures for Heart Failure:  The patient has a history of heart transplant or LVAD. no      Disposition:      Electronically signed by Perico Grigsby DO, 04/14/23, 10:19 EDT.  [facility] Hospitalist Team      Part of this note may be an electronic transcription/translation of spoken language to  printed text using the Dragon Dictation System.

## 2023-04-15 LAB — ACT BLD: 143 SECONDS (ref 89–137)

## 2023-04-17 NOTE — CASE MANAGEMENT/SOCIAL WORK
Case Management Discharge Note      Final Note: routine home                 Transportation Services  Private: Car    Final Discharge Disposition Code: 01 - home or self-care

## 2023-04-19 ENCOUNTER — OFFICE VISIT (OUTPATIENT)
Dept: CARDIOLOGY | Facility: CLINIC | Age: 58
End: 2023-04-19
Payer: COMMERCIAL

## 2023-04-19 VITALS
HEIGHT: 73 IN | WEIGHT: 239 LBS | SYSTOLIC BLOOD PRESSURE: 137 MMHG | HEART RATE: 68 BPM | OXYGEN SATURATION: 96 % | DIASTOLIC BLOOD PRESSURE: 89 MMHG | BODY MASS INDEX: 31.68 KG/M2

## 2023-04-19 DIAGNOSIS — I25.10 CORONARY ARTERY DISEASE INVOLVING NATIVE CORONARY ARTERY OF NATIVE HEART WITHOUT ANGINA PECTORIS: Primary | ICD-10-CM

## 2023-04-19 DIAGNOSIS — E78.00 PURE HYPERCHOLESTEROLEMIA: ICD-10-CM

## 2023-04-19 DIAGNOSIS — I10 PRIMARY HYPERTENSION: ICD-10-CM

## 2023-04-19 PROCEDURE — 99214 OFFICE O/P EST MOD 30 MIN: CPT | Performed by: INTERNAL MEDICINE

## 2023-04-19 NOTE — PROGRESS NOTES
"    Subjective:     Encounter Date:04/19/2023      Patient ID: Ayan Blair is a 57 y.o. male.    Chief Complaint:  History of Present Illness 57-year-old white male with history of coronary disease history of hypertension hyperlipidemia presents to the office for follow-up.  Patient is currently stable without any symptoms of chest pain or shortness of breath at rest on exertion.  No complaint of any PND orthopnea.  No palpitation dizziness syncope or swelling of the feet.  Patient has been taking all her medicines regularly.  Patient continues to smoke..  /89   Pulse 68   Ht 185.4 cm (72.99\")   Wt 108 kg (239 lb)   SpO2 96%   BMI 31.54 kg/m²     The following portions of the patient's history were reviewed and updated as appropriate: allergies, current medications, past family history, past medical history, past social history, past surgical history and problem list.  Past Medical History:   Diagnosis Date   • Coronary artery disease    • Disease of thyroid gland    • Hypertension      Past Surgical History:   Procedure Laterality Date   • APPENDECTOMY     • CARDIAC CATHETERIZATION     • CARDIAC CATHETERIZATION N/A 4/14/2023    Procedure: Left Heart Cath and coronary angiogram;  Surgeon: Russ Richard MD;  Location: [facility] CATH INVASIVE LOCATION;  Service: Cardiovascular;  Laterality: N/A;   • CARDIAC SURGERY     • COLONOSCOPY       Social History     Socioeconomic History   • Marital status: Single   Tobacco Use   • Smoking status: Every Day     Packs/day: 1.50     Years: 35.00     Pack years: 52.50     Types: Cigarettes   • Smokeless tobacco: Never   Vaping Use   • Vaping Use: Never used   Substance and Sexual Activity   • Alcohol use: Yes     Comment: beer wine and bourbon   • Drug use: Never   • Sexual activity: Defer     Family History   Problem Relation Age of Onset   • Arthritis Mother    • Diabetes Father    • Heart disease Father        Current Outpatient Medications:   •  aspirin 81 MG " EC tablet, Daily., Disp: , Rfl:   •  atorvastatin (LIPITOR) 40 MG tablet, Daily., Disp: , Rfl:   •  clopidogrel (PLAVIX) 75 MG tablet, Take 1 tablet by mouth Daily., Disp: , Rfl:   •  ezetimibe (ZETIA) 10 MG tablet, Take 1 tablet by mouth Daily., Disp: , Rfl:   •  levothyroxine (SYNTHROID, LEVOTHROID) 175 MCG tablet, Take 1 tablet by mouth 3 (Three) Times a Week. Monday, Wednesday, Friday, Disp: , Rfl:   •  metoprolol succinate XL (Toprol XL) 50 MG 24 hr tablet, Take 1 tablet by mouth Daily., Disp: 30 tablet, Rfl: 0  •  nitroglycerin (NITROSTAT) 0.4 MG SL tablet, Place 1 tablet under the tongue Every 5 (Five) Minutes As Needed for Chest Pain. Take no more than 3 doses in 15 minutes., Disp: , Rfl:   Allergies   Allergen Reactions   • Penicillins Unknown - Low Severity     Was told he is allergic by parents. Unknown reaction.       Review of Systems   Constitutional: Negative for malaise/fatigue.   Cardiovascular: Negative for chest pain, dyspnea on exertion, leg swelling and palpitations.   Respiratory: Negative for cough and shortness of breath.    Gastrointestinal: Negative for abdominal pain, nausea and vomiting.   Neurological: Negative for dizziness, focal weakness, headaches, light-headedness and numbness.   All other systems reviewed and are negative.             Objective:     Constitutional:       Appearance: Well-developed.   Eyes:      General: No scleral icterus.     Conjunctiva/sclera: Conjunctivae normal.   HENT:      Head: Normocephalic and atraumatic.   Neck:      Vascular: No carotid bruit or JVD.   Pulmonary:      Effort: Pulmonary effort is normal.      Breath sounds: Normal breath sounds. No wheezing. No rales.   Cardiovascular:      Normal rate. Regular rhythm.   Pulses:     Intact distal pulses.   Abdominal:      General: Bowel sounds are normal.      Palpations: Abdomen is soft.   Musculoskeletal:      Cervical back: Normal range of motion and neck supple. Skin:     General: Skin is warm and  dry.      Findings: No rash.   Neurological:      Mental Status: Alert.         Procedures    Lab Review:       Assessment:          Diagnosis Plan   1. Coronary artery disease involving native coronary artery of native heart without angina pectoris        2. Pure hypercholesterolemia        3. Primary hypertension               Plan:       Patient had stent placement to the LAD in the past but recently came in with chest pain typical of unstable angina and had a cardiac authorization which showed patent stent in the LAD but had about a 60% disease in the circumflex artery and a small marginal branch and is on medical therapy  Patient blood pressure currently stable on beta-blockers  Patient's lipid levels are followed by the primary care doctor and is on atorvastatin.  Patient advised to stop smoking

## 2023-04-21 LAB — QT INTERVAL: 428 MS

## 2025-02-18 ENCOUNTER — TELEPHONE (OUTPATIENT)
Dept: CARDIOLOGY | Facility: CLINIC | Age: 60
End: 2025-02-18
Payer: COMMERCIAL

## 2025-02-18 NOTE — TELEPHONE ENCOUNTER
Had call from Susy with [facility] dental office.  Patient there now and needs extraction. Patient takes Plavix. They were requesting cardiac clearance. Advised patient has not been in office since 4/19/23 and he would need to schedule appt or contact the office prescribing him the Plavix. Susy understood.

## (undated) DEVICE — PINNACLE INTRODUCER SHEATH: Brand: PINNACLE

## (undated) DEVICE — CATH DIAG IMPULSE PIG .056 6F 110CM

## (undated) DEVICE — PK TRY HEART CATH 50

## (undated) DEVICE — GW PTFE EMERALD HEPCOAT FC J TIP STD .035 3MM 150CM

## (undated) DEVICE — CATH DIAG IMPULSE FR4 6F 100CM

## (undated) DEVICE — CATH DIAG IMPULSE FL4 6F 100CM

## (undated) DEVICE — ELECTRD DEFIB M/FUNC PROPADZ RADIOL 2PK

## (undated) DEVICE — ANGIO-SEAL VIP VASCULAR CLOSURE DEVICE: Brand: ANGIO-SEAL